# Patient Record
Sex: MALE | Race: WHITE | Employment: OTHER | URBAN - METROPOLITAN AREA
[De-identification: names, ages, dates, MRNs, and addresses within clinical notes are randomized per-mention and may not be internally consistent; named-entity substitution may affect disease eponyms.]

---

## 2018-05-23 ENCOUNTER — NURSE ONLY (OUTPATIENT)
Dept: NON INVASIVE DIAGNOSTICS | Age: 83
End: 2018-05-23
Payer: MEDICARE

## 2018-05-23 DIAGNOSIS — I51.9 LEFT VENTRICULAR DYSFUNCTION: ICD-10-CM

## 2018-05-23 DIAGNOSIS — Z95.810 BIVENTRICULAR IMPLANTABLE CARDIOVERTER-DEFIBRILLATOR IN SITU: Primary | ICD-10-CM

## 2018-05-23 PROCEDURE — 93283 PRGRMG EVAL IMPLANTABLE DFB: CPT | Performed by: INTERNAL MEDICINE

## 2018-05-23 PROCEDURE — 93290 INTERROG DEV EVAL ICPMS IP: CPT | Performed by: INTERNAL MEDICINE

## 2018-07-10 ENCOUNTER — NURSE ONLY (OUTPATIENT)
Dept: NON INVASIVE DIAGNOSTICS | Age: 83
End: 2018-07-10

## 2018-07-10 DIAGNOSIS — I49.5 SSS (SICK SINUS SYNDROME) (HCC): ICD-10-CM

## 2018-07-10 DIAGNOSIS — Z95.0 PACEMAKER: Primary | ICD-10-CM

## 2018-07-10 DIAGNOSIS — I48.91 ATRIAL FIBRILLATION, UNSPECIFIED TYPE (HCC): ICD-10-CM

## 2018-07-16 ENCOUNTER — TELEPHONE (OUTPATIENT)
Dept: NON INVASIVE DIAGNOSTICS | Age: 83
End: 2018-07-16

## 2018-08-13 ENCOUNTER — TELEPHONE (OUTPATIENT)
Dept: NON INVASIVE DIAGNOSTICS | Age: 83
End: 2018-08-13

## 2018-10-08 ENCOUNTER — OFFICE VISIT (OUTPATIENT)
Dept: NON INVASIVE DIAGNOSTICS | Age: 83
End: 2018-10-08
Payer: MEDICARE

## 2018-10-08 VITALS
SYSTOLIC BLOOD PRESSURE: 140 MMHG | HEART RATE: 70 BPM | WEIGHT: 167 LBS | BODY MASS INDEX: 28.51 KG/M2 | HEIGHT: 64 IN | RESPIRATION RATE: 16 BRPM | DIASTOLIC BLOOD PRESSURE: 72 MMHG

## 2018-10-08 DIAGNOSIS — Z95.810 BIVENTRICULAR IMPLANTABLE CARDIOVERTER-DEFIBRILLATOR IN SITU: Primary | ICD-10-CM

## 2018-10-08 PROCEDURE — 4040F PNEUMOC VAC/ADMIN/RCVD: CPT | Performed by: NURSE PRACTITIONER

## 2018-10-08 PROCEDURE — 93284 PRGRMG EVAL IMPLANTABLE DFB: CPT | Performed by: NURSE PRACTITIONER

## 2018-10-08 PROCEDURE — G8484 FLU IMMUNIZE NO ADMIN: HCPCS | Performed by: NURSE PRACTITIONER

## 2018-10-08 PROCEDURE — 1101F PT FALLS ASSESS-DOCD LE1/YR: CPT | Performed by: NURSE PRACTITIONER

## 2018-10-08 PROCEDURE — 93290 INTERROG DEV EVAL ICPMS IP: CPT | Performed by: NURSE PRACTITIONER

## 2018-10-08 PROCEDURE — 1036F TOBACCO NON-USER: CPT | Performed by: NURSE PRACTITIONER

## 2018-10-08 PROCEDURE — G8427 DOCREV CUR MEDS BY ELIG CLIN: HCPCS | Performed by: NURSE PRACTITIONER

## 2018-10-08 PROCEDURE — 99214 OFFICE O/P EST MOD 30 MIN: CPT | Performed by: NURSE PRACTITIONER

## 2018-10-08 PROCEDURE — 1123F ACP DISCUSS/DSCN MKR DOCD: CPT | Performed by: NURSE PRACTITIONER

## 2018-10-08 PROCEDURE — G8598 ASA/ANTIPLAT THER USED: HCPCS | Performed by: NURSE PRACTITIONER

## 2018-10-08 PROCEDURE — G8419 CALC BMI OUT NRM PARAM NOF/U: HCPCS | Performed by: NURSE PRACTITIONER

## 2018-10-08 NOTE — PROGRESS NOTES
CRT-D. DOI 2/3/15  Mode VVIR 70/120 ppm  RV R wave:3.4  mV  Impedance: 361 ohms   Threshold:1 @ 0.4 ms  LV:  Impedance: 361 ohms  Threshold:2.25 V @ 1.0 ms (LV tip to RV coil)  Pacing: A: n/a  RV: 91%  LV: 91%  Battery Voltage/Longevity:  2.93 V, longevity 11months, charge time 4.0 sec    Arrhythmias: No significant episodes  OptiVol fluid index: stable, well below baseline    Overall device function is normal  All device programmable settings were evaluated per above and in the scanned document, along with iterative adjustments (capture thresholds) to assess and select the most appropriate final programming to provide for consistent delivery of the appropriate therapy and to verify function of the device. Impressions:    1. Permanent AF  A. Chronic with discontinuation of amiodarone therapy 6/6/11  B. Postoperative atrial fibrillation 7/07  C. Coumadin discontinued secondary to bleeding issues  D. Cardioversion x 2: 7/04;  9/14/09   E. Not on BigTip due to history of bleeding issues. On Plavix and ASA    2. SSS  A. Explant: upgrade to BI-V ICD 11/18/11    3. CAD  A. ACB 7/23/07:  vein graft to LAD    4. ICMP    5. Bi-V ICD in situ  A. MDT ICD gen change, model #: [id number], serial #: [id number]. DOI: 2/4/15    6. Valvular heart disease  A. Mitral valve repair 03/22/91, [address] Redo mitral valve replacement: 29 Biocor Valve, [facility], 7/23/07    7. H/O CVA  A. Residual right-sided weakness  B. Not on [address]. On Plavix and ASA    8. HTN    9. DM    Plan:    1 Mr Prather's CRT-D function is stable and programmed accordingly based on the above interrogation. He is in permanent AF and 91% Bi-V paced. He remains on Plavix and ASA. His device has ~11 months battery longevity. He is enrolled in [employer] and was asked to keep his communicator on at all times. He denies any heart failure symptoms today. 2. No changes were made in his medications today.      3. He will send a Carelink remote transmission Q91 days x2 and

## 2018-10-11 ASSESSMENT — ENCOUNTER SYMPTOMS: RESPIRATORY NEGATIVE: 1

## 2018-10-30 ENCOUNTER — NURSE ONLY (OUTPATIENT)
Dept: NON INVASIVE DIAGNOSTICS | Age: 83
End: 2018-10-30
Payer: MEDICARE

## 2018-10-30 DIAGNOSIS — Z95.810 BIVENTRICULAR IMPLANTABLE CARDIOVERTER-DEFIBRILLATOR IN SITU: Primary | ICD-10-CM

## 2018-10-30 DIAGNOSIS — I48.91 ATRIAL FIBRILLATION, UNSPECIFIED TYPE (HCC): ICD-10-CM

## 2018-10-30 DIAGNOSIS — I51.9 LV DYSFUNCTION: ICD-10-CM

## 2018-10-30 DIAGNOSIS — I49.5 SSS (SICK SINUS SYNDROME) (HCC): ICD-10-CM

## 2018-10-30 PROCEDURE — 93297 REM INTERROG DEV EVAL ICPMS: CPT | Performed by: INTERNAL MEDICINE

## 2018-10-30 PROCEDURE — 93296 REM INTERROG EVL PM/IDS: CPT | Performed by: INTERNAL MEDICINE

## 2018-10-30 PROCEDURE — 93295 DEV INTERROG REMOTE 1/2/MLT: CPT | Performed by: INTERNAL MEDICINE

## 2018-11-05 ENCOUNTER — TELEPHONE (OUTPATIENT)
Dept: NON INVASIVE DIAGNOSTICS | Age: 83
End: 2018-11-05

## 2018-11-05 NOTE — TELEPHONE ENCOUNTER
----- Message from Sincere Hodges RN sent at 11/5/2018  8:05 AM EST -----  Successful transmission received. Please call patient and give next appointment.

## 2019-01-01 ENCOUNTER — TELEPHONE (OUTPATIENT)
Dept: CARDIOLOGY CLINIC | Age: 84
End: 2019-01-01

## 2019-01-01 ENCOUNTER — APPOINTMENT (OUTPATIENT)
Dept: GENERAL RADIOLOGY | Age: 84
End: 2019-01-01
Payer: MEDICARE

## 2019-01-01 ENCOUNTER — HOSPITAL ENCOUNTER (OUTPATIENT)
Dept: CARDIOLOGY | Age: 84
Discharge: HOME | End: 2019-11-08
Payer: MEDICARE

## 2019-01-01 ENCOUNTER — HOSPITAL ENCOUNTER (EMERGENCY)
Age: 84
Discharge: HOME | End: 2019-11-20
Attending: EMERGENCY MEDICINE
Payer: MEDICARE

## 2019-01-01 ENCOUNTER — TELEPHONE (OUTPATIENT)
Dept: NON INVASIVE DIAGNOSTICS | Age: 84
End: 2019-01-01

## 2019-01-01 ENCOUNTER — APPOINTMENT (OUTPATIENT)
Dept: CT IMAGING | Age: 84
End: 2019-01-01
Payer: MEDICARE

## 2019-01-01 ENCOUNTER — NURSE ONLY (OUTPATIENT)
Dept: NON INVASIVE DIAGNOSTICS | Age: 84
End: 2019-01-01
Payer: MEDICARE

## 2019-01-01 VITALS
OXYGEN SATURATION: 96 % | DIASTOLIC BLOOD PRESSURE: 76 MMHG | BODY MASS INDEX: 28.67 KG/M2 | SYSTOLIC BLOOD PRESSURE: 124 MMHG | HEART RATE: 75 BPM | RESPIRATION RATE: 17 BRPM | WEIGHT: 167 LBS | TEMPERATURE: 97.2 F

## 2019-01-01 DIAGNOSIS — I51.9 LV DYSFUNCTION: Primary | ICD-10-CM

## 2019-01-01 DIAGNOSIS — Z95.810 BIVENTRICULAR IMPLANTABLE CARDIOVERTER-DEFIBRILLATOR IN SITU: ICD-10-CM

## 2019-01-01 DIAGNOSIS — Z95.810 BIVENTRICULAR IMPLANTABLE CARDIOVERTER-DEFIBRILLATOR IN SITU: Primary | ICD-10-CM

## 2019-01-01 DIAGNOSIS — R93.7 ABNORMAL CT SCAN, CERVICAL SPINE: ICD-10-CM

## 2019-01-01 DIAGNOSIS — J90 PLEURAL EFFUSION: ICD-10-CM

## 2019-01-01 DIAGNOSIS — I51.9 LV DYSFUNCTION: ICD-10-CM

## 2019-01-01 DIAGNOSIS — S09.90XA INJURY OF HEAD, INITIAL ENCOUNTER: Primary | ICD-10-CM

## 2019-01-01 LAB
ALBUMIN SERPL-MCNC: 2.6 G/DL (ref 3.5–5.2)
ALP BLD-CCNC: 157 U/L (ref 40–129)
ALT SERPL-CCNC: 24 U/L (ref 0–40)
ANION GAP SERPL CALCULATED.3IONS-SCNC: 10 MMOL/L (ref 7–16)
AST SERPL-CCNC: 44 U/L (ref 0–39)
BILIRUB SERPL-MCNC: 0.5 MG/DL (ref 0–1.2)
BUN BLDV-MCNC: 37 MG/DL (ref 8–23)
CALCIUM SERPL-MCNC: 8.1 MG/DL (ref 8.6–10.2)
CHLORIDE BLD-SCNC: 101 MMOL/L (ref 98–107)
CO2: 26 MMOL/L (ref 22–29)
CREAT SERPL-MCNC: 2.1 MG/DL (ref 0.7–1.2)
GFR AFRICAN AMERICAN: 36
GFR NON-AFRICAN AMERICAN: 30 ML/MIN/1.73
GLUCOSE BLD-MCNC: 87 MG/DL (ref 74–99)
HCT VFR BLD CALC: 31.2 % (ref 37–54)
HEMOGLOBIN: 9.9 G/DL (ref 12.5–16.5)
LV EF: 28 %
LVEF MODALITY: NORMAL
MCH RBC QN AUTO: 27.7 PG (ref 26–35)
MCHC RBC AUTO-ENTMCNC: 31.7 % (ref 32–34.5)
MCV RBC AUTO: 87.2 FL (ref 80–99.9)
PDW BLD-RTO: 15.2 FL (ref 11.5–15)
PLATELET # BLD: 98 E9/L (ref 130–450)
PLATELET CONFIRMATION: NORMAL
PMV BLD AUTO: 10.5 FL (ref 7–12)
POTASSIUM SERPL-SCNC: 3.5 MMOL/L (ref 3.5–5)
RBC # BLD: 3.58 E12/L (ref 3.8–5.8)
SODIUM BLD-SCNC: 137 MMOL/L (ref 132–146)
TOTAL PROTEIN: 5.3 G/DL (ref 6.4–8.3)
WBC # BLD: 3.9 E9/L (ref 4.5–11.5)

## 2019-01-01 PROCEDURE — 72125 CT NECK SPINE W/O DYE: CPT

## 2019-01-01 PROCEDURE — 70450 CT HEAD/BRAIN W/O DYE: CPT

## 2019-01-01 PROCEDURE — 99284 EMERGENCY DEPT VISIT MOD MDM: CPT

## 2019-01-01 PROCEDURE — 80053 COMPREHEN METABOLIC PANEL: CPT

## 2019-01-01 PROCEDURE — 36415 COLL VENOUS BLD VENIPUNCTURE: CPT

## 2019-01-01 PROCEDURE — 93284 PRGRMG EVAL IMPLANTABLE DFB: CPT | Performed by: INTERNAL MEDICINE

## 2019-01-01 PROCEDURE — 93306 TTE W/DOPPLER COMPLETE: CPT

## 2019-01-01 PROCEDURE — 71045 X-RAY EXAM CHEST 1 VIEW: CPT

## 2019-01-01 PROCEDURE — 85027 COMPLETE CBC AUTOMATED: CPT

## 2019-06-28 ENCOUNTER — TELEPHONE (OUTPATIENT)
Dept: NON INVASIVE DIAGNOSTICS | Age: 84
End: 2019-06-28

## 2019-06-28 NOTE — TELEPHONE ENCOUNTER
Received a phone call from the patient's son Nataliia Santana saying that when he was visiting his dad on Father's Day (7-16-19), he heard a beep and he was wondering if it could be coming from his father's device. Reviewed the [employer] Carelink site and no alerts have been received from his dad's remote monitor. Last transmission on 4-30-19 showed a battery life of 7 months. Nataliia Santana states his father is feeling fine. Instructed Elver to have his dad send a manual transmission. Nataliia Santana states he will help his dad send one sometime over this weekend. Told him we will review it on Monday and get back to him with the results. He voiced understanding.     Yolanda Young RN  [facility] [address]

## 2019-07-01 ENCOUNTER — TELEPHONE (OUTPATIENT)
Dept: NON INVASIVE DIAGNOSTICS | Age: 84
End: 2019-07-01

## 2019-08-01 ENCOUNTER — NURSE ONLY (OUTPATIENT)
Dept: NON INVASIVE DIAGNOSTICS | Age: 84
End: 2019-08-01
Payer: MEDICARE

## 2019-08-01 ENCOUNTER — TELEPHONE (OUTPATIENT)
Dept: NON INVASIVE DIAGNOSTICS | Age: 84
End: 2019-08-01

## 2019-08-01 DIAGNOSIS — I51.9 LEFT VENTRICULAR DYSFUNCTION: ICD-10-CM

## 2019-08-01 DIAGNOSIS — I48.91 ATRIAL FIBRILLATION, UNSPECIFIED TYPE (HCC): ICD-10-CM

## 2019-08-01 DIAGNOSIS — I49.5 SSS (SICK SINUS SYNDROME) (HCC): ICD-10-CM

## 2019-08-01 DIAGNOSIS — Z95.810 BIVENTRICULAR IMPLANTABLE CARDIOVERTER-DEFIBRILLATOR IN SITU: Primary | ICD-10-CM

## 2019-08-01 PROCEDURE — 93295 DEV INTERROG REMOTE 1/2/MLT: CPT | Performed by: INTERNAL MEDICINE

## 2019-08-01 PROCEDURE — 93297 REM INTERROG DEV EVAL ICPMS: CPT | Performed by: INTERNAL MEDICINE

## 2019-08-01 PROCEDURE — 93296 REM INTERROG EVL PM/IDS: CPT | Performed by: INTERNAL MEDICINE

## 2019-09-10 ENCOUNTER — TELEPHONE (OUTPATIENT)
Dept: NON INVASIVE DIAGNOSTICS | Age: 84
End: 2019-09-10

## 2020-01-01 ENCOUNTER — NURSE ONLY (OUTPATIENT)
Dept: NON INVASIVE DIAGNOSTICS | Age: 85
End: 2020-01-01
Payer: MEDICARE

## 2020-01-01 ENCOUNTER — VIRTUAL VISIT (OUTPATIENT)
Dept: NON INVASIVE DIAGNOSTICS | Age: 85
End: 2020-01-01
Payer: MEDICARE

## 2020-01-01 ENCOUNTER — TELEPHONE (OUTPATIENT)
Dept: NON INVASIVE DIAGNOSTICS | Age: 85
End: 2020-01-01

## 2020-01-01 ENCOUNTER — ANESTHESIA EVENT (OUTPATIENT)
Dept: CARDIAC CATH/INVASIVE PROCEDURES | Age: 85
End: 2020-01-01

## 2020-01-01 ENCOUNTER — TELEPHONE (OUTPATIENT)
Dept: CARDIOLOGY | Age: 85
End: 2020-01-01

## 2020-01-01 ENCOUNTER — HOSPITAL ENCOUNTER (OUTPATIENT)
Dept: CARDIAC CATH/INVASIVE PROCEDURES | Age: 85
Discharge: HOME | End: 2020-01-03
Payer: MEDICARE

## 2020-01-01 ENCOUNTER — ANESTHESIA (OUTPATIENT)
Dept: CARDIAC CATH/INVASIVE PROCEDURES | Age: 85
End: 2020-01-01

## 2020-01-01 VITALS
WEIGHT: 172 LBS | OXYGEN SATURATION: 94 % | HEART RATE: 82 BPM | BODY MASS INDEX: 29.37 KG/M2 | TEMPERATURE: 99.6 F | SYSTOLIC BLOOD PRESSURE: 146 MMHG | HEIGHT: 64 IN | DIASTOLIC BLOOD PRESSURE: 87 MMHG

## 2020-01-01 VITALS
OXYGEN SATURATION: 93 % | DIASTOLIC BLOOD PRESSURE: 65 MMHG | SYSTOLIC BLOOD PRESSURE: 120 MMHG | RESPIRATION RATE: 19 BRPM

## 2020-01-01 LAB
ANION GAP SERPL CALCULATED.3IONS-SCNC: 10 MMOL/L (ref 7–16)
BASOPHILS ABSOLUTE: 0.01 E9/L (ref 0–0.2)
BASOPHILS RELATIVE PERCENT: 0.3 % (ref 0–2)
BUN BLDV-MCNC: 38 MG/DL (ref 8–23)
CALCIUM SERPL-MCNC: 8.1 MG/DL (ref 8.6–10.2)
CHLORIDE BLD-SCNC: 102 MMOL/L (ref 98–107)
CO2: 29 MMOL/L (ref 22–29)
CREAT SERPL-MCNC: 2 MG/DL (ref 0.7–1.2)
EKG ATRIAL RATE: 60 BPM
EKG Q-T INTERVAL: 444 MS
EKG QRS DURATION: 132 MS
EKG QTC CALCULATION (BAZETT): 518 MS
EKG R AXIS: 136 DEGREES
EKG T AXIS: 126 DEGREES
EKG VENTRICULAR RATE: 82 BPM
EOSINOPHILS ABSOLUTE: 0.06 E9/L (ref 0.05–0.5)
EOSINOPHILS RELATIVE PERCENT: 1.9 % (ref 0–6)
GFR AFRICAN AMERICAN: 38
GFR NON-AFRICAN AMERICAN: 32 ML/MIN/1.73
GLUCOSE BLD-MCNC: 102 MG/DL (ref 74–99)
HCT VFR BLD CALC: 33.4 % (ref 37–54)
HEMOGLOBIN: 10.5 G/DL (ref 12.5–16.5)
IMMATURE GRANULOCYTES #: 0.01 E9/L
IMMATURE GRANULOCYTES %: 0.3 % (ref 0–5)
LYMPHOCYTES ABSOLUTE: 0.51 E9/L (ref 1.5–4)
LYMPHOCYTES RELATIVE PERCENT: 16.5 % (ref 20–42)
MCH RBC QN AUTO: 27.9 PG (ref 26–35)
MCHC RBC AUTO-ENTMCNC: 31.4 % (ref 32–34.5)
MCV RBC AUTO: 88.8 FL (ref 80–99.9)
MONOCYTES ABSOLUTE: 0.35 E9/L (ref 0.1–0.95)
MONOCYTES RELATIVE PERCENT: 11.3 % (ref 2–12)
NEUTROPHILS ABSOLUTE: 2.15 E9/L (ref 1.8–7.3)
NEUTROPHILS RELATIVE PERCENT: 69.7 % (ref 43–80)
OVALOCYTES: ABNORMAL
PDW BLD-RTO: 15.6 FL (ref 11.5–15)
PLATELET # BLD: 88 E9/L (ref 130–450)
PLATELET CONFIRMATION: NORMAL
PMV BLD AUTO: 10.4 FL (ref 7–12)
POTASSIUM SERPL-SCNC: 3.7 MMOL/L (ref 3.5–5)
RBC # BLD: 3.76 E12/L (ref 3.8–5.8)
SODIUM BLD-SCNC: 141 MMOL/L (ref 132–146)
WBC # BLD: 3.1 E9/L (ref 4.5–11.5)

## 2020-01-01 PROCEDURE — 93010 ELECTROCARDIOGRAM REPORT: CPT | Performed by: INTERNAL MEDICINE

## 2020-01-01 PROCEDURE — 33264 RMVL & RPLCMT DFB GEN MLT LD: CPT | Performed by: INTERNAL MEDICINE

## 2020-01-01 PROCEDURE — 93297 REM INTERROG DEV EVAL ICPMS: CPT | Performed by: INTERNAL MEDICINE

## 2020-01-01 PROCEDURE — 93284 PRGRMG EVAL IMPLANTABLE DFB: CPT | Performed by: INTERNAL MEDICINE

## 2020-01-01 PROCEDURE — 3700000000 HC ANESTHESIA ATTENDED CARE

## 2020-01-01 PROCEDURE — 80048 BASIC METABOLIC PNL TOTAL CA: CPT

## 2020-01-01 PROCEDURE — 93295 DEV INTERROG REMOTE 1/2/MLT: CPT | Performed by: INTERNAL MEDICINE

## 2020-01-01 PROCEDURE — 3700000001 HC ADD 15 MINUTES (ANESTHESIA)

## 2020-01-01 PROCEDURE — 93005 ELECTROCARDIOGRAM TRACING: CPT | Performed by: INTERNAL MEDICINE

## 2020-01-01 PROCEDURE — 93296 REM INTERROG EVL PM/IDS: CPT | Performed by: INTERNAL MEDICINE

## 2020-01-01 PROCEDURE — 99443 PR PHYS/QHP TELEPHONE EVALUATION 21-30 MIN: CPT | Performed by: INTERNAL MEDICINE

## 2020-01-01 PROCEDURE — 2580000003 HC RX 258

## 2020-01-01 PROCEDURE — 2500000003 HC RX 250 WO HCPCS

## 2020-01-01 PROCEDURE — 2720000010 HC SURG SUPPLY STERILE

## 2020-01-01 PROCEDURE — 6360000002 HC RX W HCPCS: Performed by: NURSE ANESTHETIST, CERTIFIED REGISTERED

## 2020-01-01 PROCEDURE — 2580000003 HC RX 258: Performed by: NURSE ANESTHETIST, CERTIFIED REGISTERED

## 2020-01-01 PROCEDURE — 85025 COMPLETE CBC W/AUTO DIFF WBC: CPT

## 2020-01-01 PROCEDURE — 2780000010 HC IMPLANT OTHER

## 2020-01-01 PROCEDURE — 36415 COLL VENOUS BLD VENIPUNCTURE: CPT

## 2020-01-01 PROCEDURE — 6360000002 HC RX W HCPCS

## 2020-01-01 PROCEDURE — C1882 AICD, OTHER THAN SING/DUAL: HCPCS

## 2020-01-01 PROCEDURE — 2709999900 HC NON-CHARGEABLE SUPPLY

## 2020-01-01 PROCEDURE — 2580000003 HC RX 258: Performed by: INTERNAL MEDICINE

## 2020-01-01 RX ORDER — PHENYLEPHRINE HYDROCHLORIDE 10 MG/ML
INJECTION INTRAVENOUS PRN
Status: DISCONTINUED | OUTPATIENT
Start: 2020-01-01 | End: 2020-01-01 | Stop reason: SDUPTHER

## 2020-01-01 RX ORDER — FENTANYL CITRATE 50 UG/ML
INJECTION, SOLUTION INTRAMUSCULAR; INTRAVENOUS PRN
Status: DISCONTINUED | OUTPATIENT
Start: 2020-01-01 | End: 2020-01-01 | Stop reason: SDUPTHER

## 2020-01-01 RX ORDER — SODIUM CHLORIDE 9 MG/ML
INJECTION, SOLUTION INTRAVENOUS ONCE
Status: COMPLETED | OUTPATIENT
Start: 2020-01-01 | End: 2020-01-01

## 2020-01-01 RX ORDER — PROPOFOL 10 MG/ML
INJECTION, EMULSION INTRAVENOUS PRN
Status: DISCONTINUED | OUTPATIENT
Start: 2020-01-01 | End: 2020-01-01 | Stop reason: SDUPTHER

## 2020-01-01 RX ORDER — PROPOFOL 10 MG/ML
INJECTION, EMULSION INTRAVENOUS CONTINUOUS PRN
Status: DISCONTINUED | OUTPATIENT
Start: 2020-01-01 | End: 2020-01-01 | Stop reason: SDUPTHER

## 2020-01-01 RX ORDER — VANCOMYCIN HYDROCHLORIDE 1 G/20ML
INJECTION, POWDER, LYOPHILIZED, FOR SOLUTION INTRAVENOUS PRN
Status: DISCONTINUED | OUTPATIENT
Start: 2020-01-01 | End: 2020-01-01 | Stop reason: SDUPTHER

## 2020-01-01 RX ORDER — SODIUM CHLORIDE 9 MG/ML
INJECTION, SOLUTION INTRAVENOUS CONTINUOUS PRN
Status: DISCONTINUED | OUTPATIENT
Start: 2020-01-01 | End: 2020-01-01 | Stop reason: SDUPTHER

## 2020-01-01 RX ORDER — DOXYCYCLINE HYCLATE 100 MG
100 TABLET ORAL 2 TIMES DAILY
Qty: 14 TABLET | Refills: 0 | Status: SHIPPED | OUTPATIENT
Start: 2020-01-01 | End: 2020-01-01

## 2020-01-01 RX ADMIN — SODIUM CHLORIDE: 0.9 INJECTION, SOLUTION INTRAVENOUS at 10:15

## 2020-01-01 RX ADMIN — PROPOFOL 30 MG: 10 INJECTION, EMULSION INTRAVENOUS at 10:30

## 2020-01-01 RX ADMIN — SODIUM CHLORIDE: 9 INJECTION, SOLUTION INTRAVENOUS at 10:30

## 2020-01-01 RX ADMIN — PROPOFOL 20 MCG/KG/MIN: 10 INJECTION, EMULSION INTRAVENOUS at 10:30

## 2020-01-01 RX ADMIN — VANCOMYCIN HYDROCHLORIDE 1000 MG: 1 INJECTION, POWDER, LYOPHILIZED, FOR SOLUTION INTRAVENOUS at 10:30

## 2020-01-01 RX ADMIN — PHENYLEPHRINE HYDROCHLORIDE 100 MCG: 10 INJECTION INTRAVENOUS at 10:48

## 2020-01-01 RX ADMIN — FENTANYL CITRATE 50 MCG: 50 INJECTION, SOLUTION INTRAMUSCULAR; INTRAVENOUS at 10:30

## 2020-01-01 RX ADMIN — PHENYLEPHRINE HYDROCHLORIDE 100 MCG: 10 INJECTION INTRAVENOUS at 10:44

## 2020-01-01 ASSESSMENT — PULMONARY FUNCTION TESTS
PIF_VALUE: 18
PIF_VALUE: 11
PIF_VALUE: 12
PIF_VALUE: 12
PIF_VALUE: 11
PIF_VALUE: 1
PIF_VALUE: 11
PIF_VALUE: 4
PIF_VALUE: 11
PIF_VALUE: 12
PIF_VALUE: 11
PIF_VALUE: 11
PIF_VALUE: 0
PIF_VALUE: 12
PIF_VALUE: 11
PIF_VALUE: 12
PIF_VALUE: 0
PIF_VALUE: 1
PIF_VALUE: 11
PIF_VALUE: 12
PIF_VALUE: 11
PIF_VALUE: 12
PIF_VALUE: 11
PIF_VALUE: 11
PIF_VALUE: 0
PIF_VALUE: 11
PIF_VALUE: 6
PIF_VALUE: 11
PIF_VALUE: 7
PIF_VALUE: 11
PIF_VALUE: 11
PIF_VALUE: 12
PIF_VALUE: 11
PIF_VALUE: 0
PIF_VALUE: 11
PIF_VALUE: 11
PIF_VALUE: 12
PIF_VALUE: 11

## 2020-01-01 ASSESSMENT — ENCOUNTER SYMPTOMS
RESPIRATORY NEGATIVE: 1
RESPIRATORY NEGATIVE: 1

## 2020-01-02 NOTE — H&P
Electrophysiology H&P    Marcos Sears  6/25/1930  Date of Service: 1/3/2020  PCP: Deb Nava. Pierce Camacho MD  Cardiologist: Mo Damian MD  Electrophysiologist: Hanna Greer DO    Patient Active Problem List    Diagnosis Date Noted    H/O: CVA (cerebrovascular accident) 03/12/2013     Overview Note:     A. Residual right-sided weakness  replace inactive diagnosis      Biventricular implantable cardioverter-defibrillator in situ 11/28/2011     Overview Note:     A. MDT ICD gen change, model #: [id number], serial #: [id number]. DOI: 2/4/15  B. A. Upgrade dual chamber pacemaker to Bi-V ICD 11/18/11  C. ICD is a Medtronic Protecta XT CRT/D, model I0246608, serial [id number]  D. New right ventricular lead: Med[employer]onic, model R1500086, serial [id number]  E. Left ventricular lead: Med[employer]onic, model N5559095, serial [id number]  F. Capped right atrial lead: Medtronic [id number], serial [id number]          Atrial fibrillation ([facility] [address] [address] 08/23/2011     Overview Note:     A. Chronic with discontinuation of amiodarone therapy 6/6/11  B. Postoperative atrial fibrillation 7/07  C. Coumadin discontinued secondary to bleeding issues  D. Cardioversion x2: 7/04;  9/14/09       SSS (sick sinus syndrome) ([facility] [address] 08/23/2011    CAD (coronary artery disease) 08/23/2011     Overview Note:     A. ACB 7/23/07:  vein graft to LAD        HTN (hypertension) 08/23/2011     Overview Note:             LV dysfunction 08/23/2011     Overview Note:     A. Concentric left ventricular hypertrophy  B. S/P D echo 12/26/07: 30-35% EF  C. LVEF 4/21/08:  60%      Valvular heart disease 08/23/2011     Overview Note:     A. Mitral valve repair 03/22/91, [facility]  B. Mild to moderate valvular mitral stenosis with severe tricuspid insufficiency and severe pulmonary hypertension. C. Mild aortic insufficiency  D.  Redo mitral valve replacement: 29 Biocor Valve, [facility], 7/23/07      DM (diabetes mellitus) ([facility] [address] 08/23/2011    Thrombocytopenia ([facility] [address] 08/23/2011

## 2020-01-03 PROBLEM — Z45.02 AICD AT END OF BATTERY LIFE: Status: ACTIVE | Noted: 2020-01-01

## 2020-01-03 NOTE — OP NOTE
ELECTROPHYSIOLOGY PROCEDURE REPORT      Patric Beltre  6/25/1930    80 y.o.  male  Date of Service: 1/3/2020    Referring Provider: Kip Eason MD    PROCEDURE:    1. ICD Generator Change-BiV  2. ICD Explantation    INDICATION:  1. EOL of the ICD. 2. ICMP. 3. NYHA III FC. 4. cLBBB. 5. Permanent AF. 6. Primary prevention ICD. 7. SSS. PROCEDURE PERFORMED BY: Wicho Mesa DO    ESTIMATED BLOOD LOSS: Approximately 10 cc    MEDICATIONS: Vancomycin 1 gram IV    PROCEDURE TIME: 45 minutes. COMPLICATIONS: None immediate. ANESTHESIA: Per Anesthesia    FLUOROSCOPY: 0 minutes. DESCRIPTION OF PROCEDURE: The risks, benefits, and alternatives to the procedure were discussed with the patient, and informed consent was obtained. The patient was brought to the Electrophysiology lab and after postioning the patient and prepping and draping a sterile field, the region of the left deltopectoral groove was liberally infiltrated with 1% Lidocaine. A 6 cm long transverse incision was made through the skin and subcutaneous tissue exposing the previously placed ICD and leads beneath. The ICD was removed from the pocket and the leads were disconnected from the ICD. The leads were tested and revealed normal function. The previously implanted leads were connected securely to the new ICD generator. The previously formed pocket was irrigated with antibiotic solution as was the rest of the incision. The new ICD generator was placed in a [employer] vancomycin-soaked pouch. The leads were wrapped carefully underneath the ICD generator and placed in the pocket. A securing suture with 0-Ethibond was used to secure the ICD generator to the subcutaneous space. Hemostasis was assured one last time and the pocket was closed. The pectoral fascia was closed with 2-0 Vicryl using a running mattress stitch. The subcutaneous and subcuticular layers were closed with 3-0 Vicryl & 4-0 Monocryl respectively.   An [employer]

## 2020-01-03 NOTE — ANESTHESIA POSTPROCEDURE EVALUATION
Department of Anesthesiology  Postprocedure Note    Patient: Shea Dykes  MRN: [id number]  YOB: 1930  Date of evaluation: 1/3/2020  Time:  11:28 AM     Procedure Summary     Date:  01/03/20 [place]  [facility] [place]    Anesthesia Start:  2667 Anesthesia Stop:  0262    Procedure:  ICD REPLACEMENT W/ANES Diagnosis:      Scheduled Providers:  MIMI Masters CRNA Responsible Provider:  Maria Elena Sanchez DO    Anesthesia Type:  MAC ASA Status:  4          Anesthesia Type: MAC    Karlos Phase I:      Karlos Phase II:      Last vitals: Reviewed and per EMR flowsheets.        Anesthesia Post Evaluation    Patient location during evaluation: bedside  Patient participation: complete - patient cannot participate  Level of consciousness: awake and alert  Airway patency: patent  Nausea & Vomiting: no nausea and no vomiting  Complications: no  Cardiovascular status: blood pressure returned to baseline  Respiratory status: acceptable  Hydration status: euvolemic

## 2020-01-03 NOTE — ANESTHESIA PRE PROCEDURE
ureter     Pulmonary hypertension (HCC)     SSS (sick sinus syndrome) (HCC)     Thrombocytopenia (HCC)     Thyroid disease     Unspecified diseases of blood and blood-forming organs     Valvular heart disease     Ventricular ectopy        Past Surgical History:        Procedure Laterality Date    CARDIAC DEFIBRILLATOR PLACEMENT Left 02/04/2015    Dr. Richmond Harding genterator change-Medtronic    CARDIAC DEFIBRILLATOR PLACEMENT  11/18/2011    upgrade dual chamber PM to [address]  10/24/2008    CORONARY ARTERY BYPASS GRAFT      DIAGNOSTIC CARDIAC CATH LAB PROCEDURE      HERNIA REPAIR      MITRAL VALVE REPLACEMENT      TONSILLECTOMY         Social History:    Social History     Tobacco Use    Smoking status: Never Smoker    Smokeless tobacco: Never Used   Substance Use Topics    Alcohol use: Yes     Comment: occ beer                                Counseling given: Not Answered      Vital Signs (Current):   Vitals:    01/03/20 0934   BP: (!) 146/87   Pulse: 82   Temp: 37.6 °C (99.6 °F)   SpO2: 94%   Weight: 172 lb (78 kg)   Height: 5' 4\" (1.626 m)                                              BP Readings from Last 3 Encounters:   01/03/20 (!) 146/87   11/20/19 124/76   10/08/18 (!) 140/72       NPO Status: Time of last liquid consumption: 0700 sips with meds                        Time of last solid consumption: 2300                        Date of last liquid consumption: 01/03/20                        Date of last solid food consumption: 01/02/20    BMI:   Wt Readings from Last 3 Encounters:   01/03/20 172 lb (78 kg)   11/20/19 167 lb (75.8 kg)   10/08/18 167 lb (75.8 kg)     Body mass index is 29.52 kg/m².     CBC:   Lab Results   Component Value Date    WBC 3.1 01/03/2020    RBC 3.76 01/03/2020    HGB 10.5 01/03/2020    HCT 33.4 01/03/2020    MCV 88.8 01/03/2020    RDW 15.6 01/03/2020    PLT 88 01/03/2020       CMP:   Lab Results   Component Value Date

## 2020-04-26 NOTE — PROGRESS NOTES
mellitus) ([facility] [address] 08/23/2011    Thrombocytopenia (HCC) 08/23/2011       Current Outpatient Medications   Medication Sig Dispense Refill    levothyroxine (SYNTHROID) 100 MCG tablet Take 100 mcg by mouth daily       simvastatin (ZOCOR) 20 MG tablet Take 20 mg by mouth nightly.  clopidogrel (PLAVIX) 75 MG tablet Take 75 mg by mouth daily.  pantoprazole (PROTONIX) 40 MG tablet Take 40 mg by mouth daily.  Pyridoxine HCl (VITAMIN B-6) 100 MG tablet Take 100 mg by mouth daily.  Cyanocobalamin (VITAMIN B-12 IJ) Inject as directed every 30 days       Insulin Glargine (LANTUS SC) Inject 18 Units into the skin daily       Multiple Vitamins-Minerals (CENTRUM PO) Take  by mouth.  aspirin EC 81 MG EC tablet Take 81 mg by mouth daily.  carvedilol (COREG) 12.5 MG tablet Take 12.5 mg by mouth 2 times daily       digoxin (LANOXIN) 0.25 MG tablet Take 125 mcg by mouth Five times weekly M-F only      furosemide (LASIX) 40 MG tablet Take 40 mg by mouth daily       tamsulosin (FLOMAX) 0.4 MG capsule Take 0.4 mg by mouth 2 times daily       FOLIC ACID PO Take 525 mcg by mouth daily        No current facility-administered medications for this visit. Allergies   Allergen Reactions    Pcn [Penicillins]        10/18/18: Rommel Arteaga presents to the office today for the management of: CRT-D in situ, H/O NYHA III heart failure, SSS, H/O CVA, and permanent AF. He has been feeling well and offers no complaints today. He denies any HF symptoms and his Optivol fluid index remains stable below baseline. He is active and just purchased a new car. He does not want to drive to the office in the winter. He remains in AF with a CVR and Bi-V paced 91%. He is off [address] due to bleeding issues and remains on Plavix and ASA. He reports no bleeding issues. He denies angina, dyspnea, syncope, orthopnea and PND. He also denies ICD shock.  He is scheduled to follow-up with Dr Courtney Boo in the near future and believes he is scheduled for an echocardiogram. His battery longevity is ~11 months. We briefly discussed ICD gen change. He is not pacemaker dependent. He is now enrolled in [employer] and would like to continue in-office follow-up as well. He was instructed to keep his communicator on at all times. 4/27/20--TV: Kym Montejo gives verbal consent for a telephone visit. Since his last OV he underwent an ICD generator change on 1/3/20. He and his son state that he was in the [facility] in March 2020 with a LE cellulitis and developed acute on chronic HF. Based on his OptiVol fluid index, his level has steadily increased since March. His Lasix and been changed to Demedex 40mg BID but they report his weight has not reduced. His kidney function appears to be a limiting factor. His device function is stable without any significant ventricular arrhythmias. Review of Systems   Respiratory: Negative. Cardiovascular: Negative. All other systems reviewed and are negative. PHYSICAL EXAM:  Constitutional: Oriented to person, place, and time. Psychiatric: Normal mood and affect. Thought content normal.     Last TTE--11/8/19:   Summary   Left ventricle size is normal.   Ejection fraction is visually estimated at 25-30%. Overall ejection fraction severely decreased . Septal motion consistent with post bypass surgery. Moderate concentric left ventricular hypertrophy. Indeterminate diastolic function. The left atrium is severely dilated. Moderately dilated right ventricle. Right ventricle global systolic   function is normal. TAPSE 19mm. Right ventricular septum flattened in systole and diastole consistent with   RV pressure and volume overload. Markedly enlarged right atrium size. Well seated bioprosthetic artificial valve in mitral position. No mitral valve stenosis present. Mitral valve mean gradient 7 mmHg, and   MVA by PHT 2.6 cm2.    There is moderate aortic